# Patient Record
Sex: FEMALE | Race: WHITE | NOT HISPANIC OR LATINO
[De-identification: names, ages, dates, MRNs, and addresses within clinical notes are randomized per-mention and may not be internally consistent; named-entity substitution may affect disease eponyms.]

---

## 2017-10-21 PROBLEM — Z00.00 ENCOUNTER FOR PREVENTIVE HEALTH EXAMINATION: Status: ACTIVE | Noted: 2017-10-21

## 2022-04-28 ENCOUNTER — APPOINTMENT (OUTPATIENT)
Dept: ORTHOPEDIC SURGERY | Facility: CLINIC | Age: 67
End: 2022-04-28
Payer: COMMERCIAL

## 2022-04-28 VITALS — BODY MASS INDEX: 23.08 KG/M2 | WEIGHT: 107 LBS | HEIGHT: 57 IN

## 2022-04-28 PROCEDURE — 99212 OFFICE O/P EST SF 10 MIN: CPT

## 2022-05-02 ENCOUNTER — RESULT CHARGE (OUTPATIENT)
Age: 67
End: 2022-05-02

## 2022-05-02 ENCOUNTER — APPOINTMENT (OUTPATIENT)
Dept: ORTHOPEDIC SURGERY | Facility: CLINIC | Age: 67
End: 2022-05-02
Payer: COMMERCIAL

## 2022-05-02 VITALS — HEIGHT: 57 IN | BODY MASS INDEX: 23.08 KG/M2 | WEIGHT: 107 LBS

## 2022-05-02 PROCEDURE — 73140 X-RAY EXAM OF FINGER(S): CPT | Mod: RT

## 2022-05-02 PROCEDURE — 99203 OFFICE O/P NEW LOW 30 MIN: CPT

## 2022-05-02 NOTE — REASON FOR VISIT
[FreeTextEntry2] : 4/28/22: Follow up b/l knees. Interest in restarting PT as it decreased pain and provided increased function in walking and stairs.

## 2022-05-02 NOTE — HISTORY OF PRESENT ILLNESS
[8] : 8 [3] : 3 [de-identified] : 67 y/o RHD F with R index finger intermittent pain over 10 years after laceration with glass. Pt works for election and it hurts to type. pt states dermatologist opened finger and didn't see a foreign body. [FreeTextEntry5] : pt states she picked up a piece of glass, states she went to derm they opened it with a needle 10 years ago

## 2022-05-02 NOTE — PHYSICAL EXAM
[Orientated] : orientated [Able to Communicate] : able to communicate [Normal Skin] : normal skin [Bilateral] : knee bilaterally [5___] : hamstring 5[unfilled]/5 [] : no erythema [TWNoteComboBox7] : flexion 130 degrees [de-identified] : extension 0 degrees

## 2022-05-02 NOTE — ASSESSMENT
[FreeTextEntry1] : \par Impression: Right knee\par 1. Mild patellofemoral effusion, synovitis and small popliteal cyst with extensor mechanism tendinopathy without tear.\par 2. No acute fracture, ligament tear, meniscal tear or loose body.\par E-signed by Deni Prince MD 09/13/2021\par \par 7/10/2021 Xrays of the right knee reveals 50% medial compartment narrowing and mild PF OA \par Xrays of the left knee reveals 50% medial compartment narrowing and mild PF OA \par Will monitor lump at the right lower leg, but suspect bruising from contusion reported in the garage. Ice therapy as needed. \par Encouraged PT to improve mechanics of the knees/legs. \par Patient had blood workup with outside facility, no immediate diagnosis findings. Reports today various joint pain. Still may have to consider further testing for diagnosis related to multiple joint pain if no improvement with PT.\par \par Attributes various joint pain to reclast inj for osteoporosis, reports she told her prescribing physician and stated not possible. \par Still recommended she start PT and f/u afterwards as needed.\par \par Confirms benefiting from sessions of physical therapy. Recommended resuming.

## 2022-05-02 NOTE — DISCUSSION/SUMMARY
[de-identified] : The patient was seen by Prince Rojas MD\par The documentation recorded by the scribe accurately reflects the service I personally performed and the decisions made by me.\par I, Abraham Stevens, attest that this documentation has been prepared under the direction and in the presence of Provider Prince Rojas MD.\par \par

## 2022-05-02 NOTE — IMAGING
[There are no fractures, subluxations or dislocations. No significant abnormalities are seen] : There are no fractures, subluxations or dislocations. No significant abnormalities are seen [Right] : right fingers [de-identified] : PE R index finger: small palpable mass, mobile, tender, FROM of finger, NVI

## 2022-05-02 NOTE — HISTORY OF PRESENT ILLNESS
[de-identified] : 11/4/21: Reports benefiting from physical therapy. PT reports right leg with shooting pain. \par \par 9/30/21: F/u on affected body sites.\par \par 09/29/2021: 66YO female patient present for B/L knees and left lower leg. PT reports right knee > left knee pain, and patient reports banging her left leg in the garage recently. PT in for second opinion initially saw Dr. Cantor. Had rhematologic consult with no immediate findings after Blood work up. PT reports diagnosis of osteoporosis. PT reports various joint pain after Reclast \par  \par  \par

## 2022-05-05 ENCOUNTER — RESULT REVIEW (OUTPATIENT)
Age: 67
End: 2022-05-05

## 2022-05-05 ENCOUNTER — APPOINTMENT (OUTPATIENT)
Dept: ORTHOPEDIC SURGERY | Facility: CLINIC | Age: 67
End: 2022-05-05
Payer: COMMERCIAL

## 2022-05-05 VITALS — WEIGHT: 107 LBS | HEIGHT: 57 IN | BODY MASS INDEX: 23.08 KG/M2

## 2022-05-05 PROCEDURE — 99243 OFF/OP CNSLTJ NEW/EST LOW 30: CPT

## 2022-05-07 NOTE — HISTORY OF PRESENT ILLNESS
[Gradual] : gradual [8] : 8 [3] : 3 [Dull/Aching] : dull/aching [Intermittent] : intermittent [Work] : work [de-identified] : She reports that she has foreign body at the tip of the finger for many years\par Now coming to surface and painful [] : no [FreeTextEntry1] : right index finger  [FreeTextEntry3] : 10 years ago  [FreeTextEntry5] : patient states she picked up a piece of glass, states she feel there is glass inside, she went to a dermatologist  [de-identified] : activity  [de-identified] : 10 years ago/ 05/02/22 [de-identified] : Dermatologist/ Urgent care  [de-identified] : XR

## 2022-05-07 NOTE — ASSESSMENT
[FreeTextEntry1] : I rec US to evalaute FB\par I disucssed surgery with her for exploration\par Return after US

## 2022-05-12 ENCOUNTER — APPOINTMENT (OUTPATIENT)
Dept: ORTHOPEDIC SURGERY | Facility: CLINIC | Age: 67
End: 2022-05-12
Payer: COMMERCIAL

## 2022-05-12 VITALS — WEIGHT: 107 LBS | HEIGHT: 57 IN | BODY MASS INDEX: 23.08 KG/M2

## 2022-05-12 PROCEDURE — 99213 OFFICE O/P EST LOW 20 MIN: CPT

## 2022-05-17 NOTE — ASSESSMENT
[FreeTextEntry1] : I rec observation at this time\par If more painful, larger then surgery\par Return prn

## 2022-05-17 NOTE — HISTORY OF PRESENT ILLNESS
[4] : 4 [3] : 3 [de-identified] : R IF US neg for FB, mass, cyst [FreeTextEntry1] : r hand [de-identified] : PT

## 2022-06-07 ENCOUNTER — APPOINTMENT (OUTPATIENT)
Dept: ORTHOPEDIC SURGERY | Facility: CLINIC | Age: 67
End: 2022-06-07
Payer: COMMERCIAL

## 2022-06-07 VITALS — BODY MASS INDEX: 23.08 KG/M2 | WEIGHT: 107 LBS | HEIGHT: 57 IN

## 2022-06-07 DIAGNOSIS — S60.459A SUPERFICIAL FOREIGN BODY OF UNSPECIFIED FINGER, INITIAL ENCOUNTER: ICD-10-CM

## 2022-06-07 DIAGNOSIS — R29.898 OTHER SYMPTOMS AND SIGNS INVOLVING THE MUSCULOSKELETAL SYSTEM: ICD-10-CM

## 2022-06-07 DIAGNOSIS — M17.0 BILATERAL PRIMARY OSTEOARTHRITIS OF KNEE: ICD-10-CM

## 2022-06-07 DIAGNOSIS — M22.2X2 PATELLOFEMORAL DISORDERS, RIGHT KNEE: ICD-10-CM

## 2022-06-07 DIAGNOSIS — M22.2X1 PATELLOFEMORAL DISORDERS, RIGHT KNEE: ICD-10-CM

## 2022-06-07 PROCEDURE — 99213 OFFICE O/P EST LOW 20 MIN: CPT

## 2022-06-07 NOTE — HISTORY OF PRESENT ILLNESS
[9] : 9 [7] : 7 [Dull/Aching] : dull/aching [Sharp] : sharp [] : Post Surgical Visit: no [FreeTextEntry1] : vikas knees

## 2022-06-07 NOTE — PHYSICAL EXAM
[Orientated] : orientated [Able to Communicate] : able to communicate [Normal Skin] : normal skin [Bilateral] : knee bilaterally [] : no erythema [de-identified] : -5/5 quad and hamstring [TWNoteComboBox7] : flexion 130 degrees [de-identified] : extension 0 degrees

## 2022-06-07 NOTE — ASSESSMENT
[FreeTextEntry1] : Impression: Right knee\par 1. Mild patellofemoral effusion, synovitis and small popliteal cyst with extensor mechanism tendinopathy without tear.\par 2. No acute fracture, ligament tear, meniscal tear or loose body.\par E-signed by Deni Prince MD 09/13/2021\par \par 7/10/2021 Xrays of the right knee reveals 50% medial compartment narrowing and mild PF OA \par Xrays of the left knee reveals 50% medial compartment narrowing and mild PF OA \par Will monitor lump at the right lower leg, but suspect bruising from contusion reported in the garage. Ice therapy as needed. \par Encouraged PT to improve mechanics of the knees/legs. \par Patient had blood workup with outside facility, no immediate diagnosis findings. Reports today various joint pain. Still may have to consider further testing for diagnosis related to multiple joint pain if no improvement with PT.\par \par Attributes various joint pain to reclast inj for osteoporosis, reports she told her prescribing physician and stated not possible. \par Still recommended she start PT and f/u afterwards as needed.\par \par Confirms benefiting from sessions of physical therapy. Recommended resuming. \par \par 6/722: continue PT and HEP to improve mechanics and reduce pain, and strength knee. \par

## 2022-06-07 NOTE — DISCUSSION/SUMMARY
[de-identified] : The documentation recorded by the scribe accurately reflects the service I personally performed and the decisions made by me.\par I, Abraham Stevens, attest that this documentation has been prepared under the direction and in the presence of Provider Prince Rojas MD.\par \par The patient was seen by Prince Rojas MD\par

## 2022-06-07 NOTE — REASON FOR VISIT
[FreeTextEntry2] : 6/7/22: pt is following up on pain in vikas knees. Going to PT doing good. She feels weakness. SHe has eye surgery aug 7th. She was getting reclast injections for osteoporosis.

## 2022-06-16 ENCOUNTER — APPOINTMENT (OUTPATIENT)
Dept: ORTHOPEDIC SURGERY | Facility: CLINIC | Age: 67
End: 2022-06-16

## 2022-06-23 ENCOUNTER — APPOINTMENT (OUTPATIENT)
Dept: ORTHOPEDIC SURGERY | Facility: CLINIC | Age: 67
End: 2022-06-23

## 2022-10-27 ENCOUNTER — APPOINTMENT (OUTPATIENT)
Dept: ORTHOPEDIC SURGERY | Facility: CLINIC | Age: 67
End: 2022-10-27

## 2022-10-27 DIAGNOSIS — Z78.9 OTHER SPECIFIED HEALTH STATUS: ICD-10-CM

## 2022-10-27 DIAGNOSIS — M79.18 MYALGIA, OTHER SITE: ICD-10-CM

## 2022-10-27 PROCEDURE — 99204 OFFICE O/P NEW MOD 45 MIN: CPT

## 2022-10-27 PROCEDURE — 73564 X-RAY EXAM KNEE 4 OR MORE: CPT | Mod: LT

## 2022-10-27 RX ORDER — METHYLPREDNISOLONE 4 MG/1
4 TABLET ORAL
Qty: 1 | Refills: 0 | Status: ACTIVE | COMMUNITY
Start: 2022-10-27 | End: 1900-01-01

## 2022-10-27 NOTE — HISTORY OF PRESENT ILLNESS
[de-identified] : 66 year old female  ( [employer] worker )  right knee pain since 10/2022 has seen Dr Rojas\par The pain is located  anterior, medial \par The pain is associated with  weakness, swelling, clicking \par Worse with activity and better at rest.\par Has tried ice, nsaids \par

## 2022-10-27 NOTE — ASSESSMENT
[FreeTextEntry1] : Left X-Ray Examination of the KNEE (4 views): medial and patellofemoral degenerate changes.\par \par - We discussed their diagnosis and treatment options at length including the risks and benefits of both surgical and non-surgical options.\par - We will continue conservative treatment with activity modification, PT, icing, weight loss, and anti-inflammatory medications.\par - The patient was provided with a PT prescription to work on ROM, hip ER/abductors strengthening, quad/hamstring stretches and strengthening, and other exercises \par - The patient was advised to let pain guide the gradual advancement of activities. \par - MDP\par - Discussed the possible side effects of medication along with the timing and frequency for taking.\par - Follow up as needed in 6 weeks to re-evaluate, if no improvement we spoke about possibility of viscosupplementation injections

## 2022-10-27 NOTE — IMAGING
[de-identified] : \par LEFT KNEE\par Inspection:  mild effusion\par Palpation: medial joint line tenderness, anterior tenderness\par Knee Range of Motion:  3-125 \par Strength: 5/5 Quadriceps strength, 5/5 Hamstring strength\par Neurological: light touch is intact throughout\par Ligament Stability and Special Tests: \par McMurrays: neg\par Lachman: neg\par Pivot Shift: neg\par Posterior Drawer: neg\par Valgus: neg\par Varus: neg\par Patella Apprehension: neg\par Patella Maltracking: neg\par

## 2023-01-21 ENCOUNTER — APPOINTMENT (OUTPATIENT)
Dept: ORTHOPEDIC SURGERY | Facility: CLINIC | Age: 68
End: 2023-01-21
Payer: COMMERCIAL

## 2023-01-21 VITALS — WEIGHT: 107 LBS | BODY MASS INDEX: 23.08 KG/M2 | HEIGHT: 57 IN

## 2023-01-21 DIAGNOSIS — M81.0 AGE-RELATED OSTEOPOROSIS W/OUT CURRENT PATHOLOGICAL FRACTURE: ICD-10-CM

## 2023-01-21 DIAGNOSIS — S33.5XXA SPRAIN OF LIGAMENTS OF LUMBAR SPINE, INITIAL ENCOUNTER: ICD-10-CM

## 2023-01-21 PROCEDURE — 72170 X-RAY EXAM OF PELVIS: CPT

## 2023-01-21 PROCEDURE — 99213 OFFICE O/P EST LOW 20 MIN: CPT

## 2023-01-21 PROCEDURE — 72100 X-RAY EXAM L-S SPINE 2/3 VWS: CPT

## 2023-01-21 RX ORDER — METHYLPREDNISOLONE 4 MG/1
4 TABLET ORAL
Qty: 1 | Refills: 0 | Status: ACTIVE | COMMUNITY
Start: 2023-01-21 | End: 1900-01-01

## 2023-01-21 NOTE — HISTORY OF PRESENT ILLNESS
[Lower back] : lower back [8] : 8 [Dull/Aching] : dull/aching [Localized] : localized [Sharp] : sharp [Constant] : constant [Bending forward] : bending forward [Full time] : Work status: full time [] : Post Surgical Visit: no [FreeTextEntry3] : 1/18/23 [FreeTextEntry5] : Patient complains of lower back pain after lifting heavy things since 1/18/23\par

## 2023-01-21 NOTE — REASON FOR VISIT
[FreeTextEntry2] : This is a 67 year old F with lower back pain radiating down both legs since lifting heavy on 1/18/23.  No history or treatment.  Had w/u from cardio as she felt her legs were swollen.  everything OK.  hx of osteoporosis.

## 2023-01-21 NOTE — ASSESSMENT
[FreeTextEntry1] : We reviewed the findings and her history.\par MDP and PT planned.\par Should her sx persist, we will obtain an MRI

## 2023-01-21 NOTE — IMAGING
[AP] : anteroposterior [Spondylolithesis] : Spondylolithesis [de-identified] : pevlic calcification - most likely fibroid

## 2023-01-26 ENCOUNTER — APPOINTMENT (OUTPATIENT)
Dept: ORTHOPEDIC SURGERY | Facility: CLINIC | Age: 68
End: 2023-01-26
Payer: COMMERCIAL

## 2023-01-26 VITALS — HEIGHT: 57 IN | WEIGHT: 107 LBS | BODY MASS INDEX: 23.08 KG/M2

## 2023-01-26 DIAGNOSIS — Z78.9 OTHER SPECIFIED HEALTH STATUS: ICD-10-CM

## 2023-01-26 PROCEDURE — 72100 X-RAY EXAM L-S SPINE 2/3 VWS: CPT

## 2023-01-26 PROCEDURE — 72170 X-RAY EXAM OF PELVIS: CPT

## 2023-01-26 PROCEDURE — 20552 NJX 1/MLT TRIGGER POINT 1/2: CPT

## 2023-01-26 PROCEDURE — 99214 OFFICE O/P EST MOD 30 MIN: CPT | Mod: 25

## 2023-01-26 PROCEDURE — J3490M: CUSTOM

## 2023-01-26 NOTE — DISCUSSION/SUMMARY
[de-identified] : REVIEWED The case and the imaging with him \par hard to say if its her back or her hips - has pain around the posterior hip with very limited ROM \par TPI tolerated well \par \par \par discussed that i do recommend she treat the osteoporosis - however its not something that I treat so she need\par \par shes going to go to PT

## 2023-01-26 NOTE — HISTORY OF PRESENT ILLNESS
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

## 2023-05-10 ENCOUNTER — APPOINTMENT (OUTPATIENT)
Dept: ORTHOPEDIC SURGERY | Facility: CLINIC | Age: 68
End: 2023-05-10
Payer: COMMERCIAL

## 2023-05-10 VITALS — BODY MASS INDEX: 23.08 KG/M2 | WEIGHT: 107 LBS | HEIGHT: 57 IN

## 2023-05-10 DIAGNOSIS — M70.50 OTHER BURSITIS OF KNEE, UNSPECIFIED KNEE: ICD-10-CM

## 2023-05-10 PROCEDURE — 99213 OFFICE O/P EST LOW 20 MIN: CPT

## 2023-05-10 NOTE — HISTORY OF PRESENT ILLNESS
[8] : 8 [0] : 0 [Dull/Aching] : dull/aching [Localized] : localized [Standing] : standing [Walking] : walking [Stairs] : stairs [Full time] : Work status: full time [de-identified] : 66 y/o F with atraumatic L knee pain x 1 week. denies buckling or locking. denies numbness/tingling. She has tried tylenol without relief. no pain with stairs. She recently stopped doing yoga. Pt unable to take NSAIDs or steriods \par denies DM.\par  [] : Post Surgical Visit: no [FreeTextEntry1] : left leg [FreeTextEntry5] : Patient is here with pain in her Left leg since 1 week ago, no injury\par states she had done a Doppler done came back negative\par

## 2023-05-10 NOTE — IMAGING
[de-identified] : PE L knee: +swelling to medial proximal tibia, +swelling, no JLT, neg colin, ligs stable, able to SLR, FROM, NVI\par

## 2023-05-10 NOTE — ASSESSMENT
[FreeTextEntry1] : A/P L knee pes anserine bursitis\par - WBAT\par - tylenol\par - stretching\par - f/u sports medicine\par

## 2023-05-18 ENCOUNTER — APPOINTMENT (OUTPATIENT)
Dept: ORTHOPEDIC SURGERY | Facility: CLINIC | Age: 68
End: 2023-05-18
Payer: COMMERCIAL

## 2023-05-18 VITALS — HEIGHT: 59 IN | WEIGHT: 107 LBS | BODY MASS INDEX: 21.57 KG/M2

## 2023-05-18 DIAGNOSIS — M70.52 OTHER BURSITIS OF KNEE, LEFT KNEE: ICD-10-CM

## 2023-05-18 PROCEDURE — 99214 OFFICE O/P EST MOD 30 MIN: CPT

## 2023-05-18 RX ORDER — DICLOFENAC SODIUM 1% 10 MG/G
1 GEL TOPICAL 3 TIMES DAILY
Qty: 1 | Refills: 0 | Status: ACTIVE | COMMUNITY
Start: 2023-05-18 | End: 1900-01-01

## 2023-05-18 NOTE — ASSESSMENT
[FreeTextEntry1] : Left X-Ray Examination of the KNEE (4 views): medial and patellofemoral degenerate changes.\par \par - We discussed their diagnosis and treatment options at length including the risks and benefits of both surgical and non-surgical options.\par - We will continue conservative treatment with activity modification, PT, icing, weight loss, and anti-inflammatory medications.\par - The patient was provided with a PT prescription to work on ROM, hip ER/abductors strengthening, quad/hamstring stretches and strengthening, and other exercises \par - The patient was advised to let pain guide the gradual advancement of activities. \par - voltaren gel rx\par - Patient was given a prescription for an anti-inflammatory medication.  They will take it for the next week and then on an as needed basis, as long as there are no medical contra-indications.  Patient is counseled on possible GI, renal, and cardiovascular side effects.\par - Follow up as needed in 6 weeks to re-evaluate, if no improvement we spoke about possibility of viscosupplementation injections

## 2023-05-18 NOTE — HISTORY OF PRESENT ILLNESS
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

## 2023-05-18 NOTE — IMAGING
[de-identified] : \par LEFT KNEE\par Inspection:  mild effusion\par Palpation: medial joint line tenderness, anterior tenderness and pes ttp\par Knee Range of Motion:  3-125 \par Strength: 5/5 Quadriceps strength, 5/5 Hamstring strength\par Neurological: light touch is intact throughout\par Ligament Stability and Special Tests: \par McMurrays: neg\par Lachman: neg\par Pivot Shift: neg\par Posterior Drawer: neg\par Valgus: neg\par Varus: neg\par Patella Apprehension: neg\par Patella Maltracking: neg\par

## 2023-11-05 ENCOUNTER — APPOINTMENT (OUTPATIENT)
Dept: ORTHOPEDIC SURGERY | Facility: CLINIC | Age: 68
End: 2023-11-05
Payer: COMMERCIAL

## 2023-11-05 VITALS — HEIGHT: 59 IN | WEIGHT: 100 LBS | BODY MASS INDEX: 20.16 KG/M2

## 2023-11-05 PROCEDURE — 99213 OFFICE O/P EST LOW 20 MIN: CPT

## 2023-11-05 PROCEDURE — 72100 X-RAY EXAM L-S SPINE 2/3 VWS: CPT

## 2023-11-05 PROCEDURE — 72170 X-RAY EXAM OF PELVIS: CPT

## 2023-12-04 ENCOUNTER — APPOINTMENT (OUTPATIENT)
Dept: ORTHOPEDIC SURGERY | Facility: CLINIC | Age: 68
End: 2023-12-04
Payer: COMMERCIAL

## 2023-12-04 VITALS — WEIGHT: 100 LBS | HEIGHT: 59 IN | BODY MASS INDEX: 20.16 KG/M2

## 2023-12-04 DIAGNOSIS — M16.11 UNILATERAL PRIMARY OSTEOARTHRITIS, RIGHT HIP: ICD-10-CM

## 2023-12-04 DIAGNOSIS — M16.12 UNILATERAL PRIMARY OSTEOARTHRITIS, LEFT HIP: ICD-10-CM

## 2023-12-04 PROCEDURE — 99213 OFFICE O/P EST LOW 20 MIN: CPT

## 2024-02-26 ENCOUNTER — APPOINTMENT (OUTPATIENT)
Dept: ORTHOPEDIC SURGERY | Facility: CLINIC | Age: 69
End: 2024-02-26
Payer: COMMERCIAL

## 2024-02-26 DIAGNOSIS — M16.0 BILATERAL PRIMARY OSTEOARTHRITIS OF HIP: ICD-10-CM

## 2024-02-26 DIAGNOSIS — M17.12 UNILATERAL PRIMARY OSTEOARTHRITIS, LEFT KNEE: ICD-10-CM

## 2024-02-26 DIAGNOSIS — M43.10 SPONDYLOLISTHESIS, SITE UNSPECIFIED: ICD-10-CM

## 2024-02-26 PROCEDURE — 72110 X-RAY EXAM L-2 SPINE 4/>VWS: CPT

## 2024-02-26 PROCEDURE — 73590 X-RAY EXAM OF LOWER LEG: CPT | Mod: LT

## 2024-02-26 PROCEDURE — 72170 X-RAY EXAM OF PELVIS: CPT

## 2024-02-26 PROCEDURE — 99213 OFFICE O/P EST LOW 20 MIN: CPT | Mod: 25

## 2024-02-26 NOTE — HISTORY OF PRESENT ILLNESS
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 States she had been dealing with an e. coli infection recently. Had a surgery done on her left leg when she was younger. States she consulted with a vascular surgeon in regards to her lower leg pains and was told it was okay  Had had reclast in may last year  xrays today: Lspine - grade 1 ds at L3-4  AP PELVIS - severe hip OA B  LEFT TIB/FIB - no obvious findings  [] : no [FreeTextEntry5] : SHANAE is here today to follow up on her leg pain. pt denies lower back pain. c/o LEFT leg pain.

## 2024-02-26 NOTE — DISCUSSION/SUMMARY
[Medication Risks Reviewed] : Medication risks reviewed [de-identified] : REVIEWED The case and the imaging with him  hard to say if its her back or her hips - has pain around the posterior hip with very limited ROM   indicated for MRi of the l spine - referral to pain managment  discussed that i do recommend she treat the osteoporosis - however its not something that I treat so she need

## 2024-02-26 NOTE — PHYSICAL EXAM
[Bilateral] : hip bilaterally [] : negative straight leg raise [FreeTextEntry3] : very severe limited ROM

## 2024-02-27 ENCOUNTER — APPOINTMENT (OUTPATIENT)
Dept: MRI IMAGING | Facility: CLINIC | Age: 69
End: 2024-02-27
Payer: COMMERCIAL

## 2024-02-27 PROCEDURE — 72148 MRI LUMBAR SPINE W/O DYE: CPT

## 2024-03-02 ENCOUNTER — APPOINTMENT (OUTPATIENT)
Dept: ORTHOPEDIC SURGERY | Facility: CLINIC | Age: 69
End: 2024-03-02
Payer: COMMERCIAL

## 2024-03-02 VITALS — WEIGHT: 100 LBS | HEIGHT: 59 IN | BODY MASS INDEX: 20.16 KG/M2

## 2024-03-02 PROCEDURE — 99214 OFFICE O/P EST MOD 30 MIN: CPT

## 2024-03-02 NOTE — DISCUSSION/SUMMARY
[Medication Risks Reviewed] : Medication risks reviewed [de-identified] : REVIEWED The case and the imaging with her stenosis/spondylolisthesis L2-5  reviewed the case   Discussion of the hip OA and that I dont do those - explained that in detail   indicated for MRi of the l spine - referral to pain managment  discussed that i do recommend she treat the osteoporosis - however its not something that I treat so she need

## 2024-03-02 NOTE — HISTORY OF PRESENT ILLNESS
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 States she had been dealing with an e. coli infection recently. Had a surgery done on her left leg when she was younger. States she consulted with a vascular surgeon in regards to her lower leg pains and was told it was okay  Had had reclast in may last year  xrays today: Lspine - grade 1 ds at L3-4  AP PELVIS - severe hip OA B  LEFT TIB/FIB - no obvious findings   3/2/24: here for fu - plan at last was mri - overall the pain remain in the legs -   MRi L spine - see report  [] : no [FreeTextEntry5] : Pt presents today for a follow u visit to reiew mri results of the L-spine

## 2024-03-05 ENCOUNTER — APPOINTMENT (OUTPATIENT)
Dept: PAIN MANAGEMENT | Facility: CLINIC | Age: 69
End: 2024-03-05
Payer: COMMERCIAL

## 2024-03-05 VITALS — HEIGHT: 59 IN | BODY MASS INDEX: 20.16 KG/M2 | WEIGHT: 100 LBS

## 2024-03-05 PROCEDURE — 99244 OFF/OP CNSLTJ NEW/EST MOD 40: CPT

## 2024-03-18 RX ORDER — TIZANIDINE 4 MG/1
4 TABLET ORAL
Qty: 30 | Refills: 1 | Status: ACTIVE | COMMUNITY
Start: 2024-03-18 | End: 1900-01-01

## 2024-03-26 ENCOUNTER — NON-APPOINTMENT (OUTPATIENT)
Age: 69
End: 2024-03-26

## 2024-03-26 ENCOUNTER — APPOINTMENT (OUTPATIENT)
Dept: PAIN MANAGEMENT | Facility: CLINIC | Age: 69
End: 2024-03-26
Payer: COMMERCIAL

## 2024-03-26 PROCEDURE — 64483 NJX AA&/STRD TFRM EPI L/S 1: CPT | Mod: LT

## 2024-03-26 PROCEDURE — J3490M: CUSTOM

## 2024-03-26 PROCEDURE — 64484 NJX AA&/STRD TFRM EPI L/S EA: CPT | Mod: 59,LT

## 2024-03-26 NOTE — PROCEDURE
[FreeTextEntry3] : Date of Service: 03/26/2024   Account: [id number]  Patient: SHANAE ROBERTSON   YOB: 1955  Age: 68 year   Surgeon:                                                         Elgin Saunders D.O.  Assistant:                                                        None  Pre-Operative Diagnosis:                              Lumbosacral radiculitis (M54.17)  Post-Operative Diagnosis:                            Same  Procedure:                                                      Left L3-4, L4-5, transforaminal epidural steroid injection under fluoroscopic guidance.  Anesthesia:                                                     Local with MAC  This procedure was carried out using fluoroscopic guidance.  The risks and benefits of the procedure were discussed extensively with the patient.  The consent of the patient was obtained and the following procedure was performed. The patient was placed in the prone position on the fluoroscopic table and the lumbar area was prepped and draped in a sterile fashion. A timeout was performed with all essential staff present and the site and side were verified.   The left L3-4 neural foramen was identified on left oblique "ibrahima dog" anatomical view at the 6 o'clock position using fluoroscopic guidance, and the area was marked. The overlying skin and subcutaneous structures were anesthetized using sterile technique with 1% Lidocaine.  A 22-gauge spinal needle was directed toward the inferior (6 o'clock) position of the pedicle, which formed the roof of the identified foramen.  Once in the epidural space, after negative aspiration for heme and CSF, 1cc of Omnipaque contrast was injected under live fluoroscopy to confirm epidural location and assess filling defects and rule out intravascular needle placement. The following epidurogram was observed: no intravascular or intrathecal flow pattern was noted.  No blood or CSF was aspirated. Contrast spread medially in epidural space.  After this, an injectate of 1 cc preservative free normal saline plus 6 mg of betamethasone and 1 cc of 0.25% bupivacaine was injected in the epidural space.  The left L4-5 neural foramen was identified on left oblique "ibrahima dog" anatomical view at the 6 o'clock position using fluoroscopic guidance, and the area was marked. The overlying skin and subcutaneous structures were anesthetized using sterile technique with 1% Lidocaine.  A 22-gauge spinal needle was directed toward the inferior (6 o'clock) position of the pedicle, which formed the roof of the identified foramen.  Once in the epidural space, after negative aspiration for heme and CSF, 1cc of Omnipaque contrast was injected under live fluoroscopy to confirm epidural location and assess filling defects and rule out intravascular needle placement. The following epidurogram was observed: no intravascular or intrathecal flow pattern was noted.  No blood or CSF was aspirated. Contrast spread medially in epidural space.  After this, an injectate of 1 cc preservative free normal saline plus 6 mg of betamethasone and 1 cc of 0.25% bupivacaine was injected in the epidural space.  The needle was subsequently removed.  Vital signs remained normal.  Pulse oximeter was used throughout the procedure and the patient's pulse and oxygen saturation remained within normal limits.  The patient tolerated the procedure well.  There were no complications.  The patient was instructed to apply ice over the injection sites for twenty minutes every two hours for the next 24 to 48 hours.  The patient was also instructed to contact me immediately if there were any problems.  Elgin Saunders D.O.

## 2024-04-09 ENCOUNTER — APPOINTMENT (OUTPATIENT)
Dept: PAIN MANAGEMENT | Facility: CLINIC | Age: 69
End: 2024-04-09
Payer: COMMERCIAL

## 2024-04-09 VITALS — BODY MASS INDEX: 20.16 KG/M2 | HEIGHT: 59 IN | WEIGHT: 100 LBS

## 2024-04-09 PROCEDURE — 99213 OFFICE O/P EST LOW 20 MIN: CPT

## 2024-04-09 NOTE — PHYSICAL EXAM
[de-identified] : Constitutional; Appears well, no apparent distress Ability to communicate: Normal  Respiratory: non-labored breathing Skin: No rash noted Head: Normocephalic, atraumatic Neck: no visible thyroid enlargement Eyes: Extraocular movements intact Neurologic: Alert and oriented x3 Psychiatric: normal mood, affect and behavior [] : non-antalgic

## 2024-04-09 NOTE — HISTORY OF PRESENT ILLNESS
[Lower back] : lower back [8] : 8 [Radiating] : radiating [Constant] : constant [Rest] : rest [Sitting] : sitting [Walking] : walking [FreeTextEntry1] : 04/09/2024: s/p Left L3-4 L4-5 TFESI on 03/26/24 with >60% relief and improvement of ADLs. Still has some pain at the left anterior thigh and lower leg.   Initial HPI 03/05/2024: Pain started a few weeks ago and is on the LEFT buttock, anterior thigh and shin described as a sharp pain. Saw Dr. Fry who recommended LESI.   MRI Lumbar Spine 2/27/24 independently reviewed: L2-3, L3-4, L4-5 moderate/severe stenosis with nerve impingement.  Conservative Care: has been doing PT, Yoga  Pain Medications: Tylenol PRN  Past Injections: TPI with some relief  Spine surgery: none  Blood thinners: none  [] : no [FreeTextEntry7] : b/l sides  [TWNoteComboBox1] : 50%

## 2024-04-09 NOTE — DISCUSSION/SUMMARY
[de-identified] : After discussing various treatment options with the patient including but not limited to oral medications, physical therapy, exercise modalities as well as interventional spinal injections, we have decided with the following plan:  - Continue Home exercises, stretching, activity modification, physical therapy, and conservative care. - MRI report and/or images was reviewed and discussed with the patient. - Recommend Left L3-4, L4-5 Transforaminal Epidural Steroid Injection under fluoroscopic guidance with image. - The risks, benefits and alternatives of the proposed procedure were explained in detail with the patient. The risks outlined include but are not limited to infection, bleeding, post-dural puncture headache, nerve injury, a temporary increase in pain, failure to resolve symptoms, allergic reaction, symptom recurrence, and possible elevation of blood sugar in diabetics. All questions were answered to patient's apparent satisfaction and he/she verbalized an understanding. - Patient is presenting with acute/sub-acute radicular pain with impairment in ADLs and functionality.  The pain has not responded to conservative care including NSAID therapy and/or physical therapy.  There is no bleeding tendency, unstable medical condition, or systemic infection. - Follow up in 1-2 weeks post injection for re-evaluation.

## 2024-05-21 ENCOUNTER — APPOINTMENT (OUTPATIENT)
Dept: PAIN MANAGEMENT | Facility: CLINIC | Age: 69
End: 2024-05-21
Payer: COMMERCIAL

## 2024-05-21 PROCEDURE — J3490M: CUSTOM

## 2024-05-21 PROCEDURE — 64483 NJX AA&/STRD TFRM EPI L/S 1: CPT | Mod: LT

## 2024-05-21 PROCEDURE — 64484 NJX AA&/STRD TFRM EPI L/S EA: CPT | Mod: LT,59

## 2024-05-21 NOTE — PROCEDURE
[FreeTextEntry3] : Date of Service: 05/21/2024   Account: [id number]  Patient: SHANAE ROBERTSON   YOB: 1955  Age: 68 year   Surgeon:                                                         Elgin Saunders D.O.  Assistant:                                                        None  Pre-Operative Diagnosis:                              Lumbosacral radiculitis (M54.17)  Post-Operative Diagnosis:                            Same  Procedure:                                                      Left L3-4, L4-5, transforaminal epidural steroid injection under fluoroscopic guidance.  Anesthesia:                                                     Local with MAC  This procedure was carried out using fluoroscopic guidance.  The risks and benefits of the procedure were discussed extensively with the patient.  The consent of the patient was obtained and the following procedure was performed. The patient was placed in the prone position on the fluoroscopic table and the lumbar area was prepped and draped in a sterile fashion. A timeout was performed with all essential staff present and the site and side were verified.   The left L3-4 neural foramen was identified on left oblique "ibrahima dog" anatomical view at the 6 o'clock position using fluoroscopic guidance, and the area was marked. The overlying skin and subcutaneous structures were anesthetized using sterile technique with 1% Lidocaine.  A 22-gauge spinal needle was directed toward the inferior (6 o'clock) position of the pedicle, which formed the roof of the identified foramen.  Once in the epidural space, after negative aspiration for heme and CSF, 1cc of Omnipaque contrast was injected under live fluoroscopy to confirm epidural location and assess filling defects and rule out intravascular needle placement. The following epidurogram was observed: no intravascular or intrathecal flow pattern was noted.  No blood or CSF was aspirated. Contrast spread medially in epidural space.  After this, an injectate of 1 cc preservative free normal saline plus 6 mg of betamethasone and 1 cc of 0.25% bupivacaine was injected in the epidural space.  The left L4-5 neural foramen was identified on left oblique "ibrahima dog" anatomical view at the 6 o'clock position using fluoroscopic guidance, and the area was marked. The overlying skin and subcutaneous structures were anesthetized using sterile technique with 1% Lidocaine.  A 22-gauge spinal needle was directed toward the inferior (6 o'clock) position of the pedicle, which formed the roof of the identified foramen.  Once in the epidural space, after negative aspiration for heme and CSF, 1cc of Omnipaque contrast was injected under live fluoroscopy to confirm epidural location and assess filling defects and rule out intravascular needle placement. The following epidurogram was observed: no intravascular or intrathecal flow pattern was noted.  No blood or CSF was aspirated. Contrast spread medially in epidural space.  After this, an injectate of 1 cc preservative free normal saline plus 6 mg of betamethasone and 1 cc of 0.25% bupivacaine was injected in the epidural space.  The needle was subsequently removed.  Vital signs remained normal.  Pulse oximeter was used throughout the procedure and the patient's pulse and oxygen saturation remained within normal limits.  The patient tolerated the procedure well.  There were no complications.  The patient was instructed to apply ice over the injection sites for twenty minutes every two hours for the next 24 to 48 hours.  The patient was also instructed to contact me immediately if there were any problems.  Elgin Saunders D.O.

## 2024-05-27 ENCOUNTER — APPOINTMENT (OUTPATIENT)
Dept: ORTHOPEDIC SURGERY | Facility: CLINIC | Age: 69
End: 2024-05-27
Payer: COMMERCIAL

## 2024-05-27 VITALS — WEIGHT: 100 LBS | HEIGHT: 59 IN | BODY MASS INDEX: 20.16 KG/M2

## 2024-05-27 PROCEDURE — 99203 OFFICE O/P NEW LOW 30 MIN: CPT

## 2024-05-27 PROCEDURE — 99213 OFFICE O/P EST LOW 20 MIN: CPT

## 2024-05-27 PROCEDURE — 73552 X-RAY EXAM OF FEMUR 2/>: CPT | Mod: LT

## 2024-05-27 RX ORDER — METHYLPREDNISOLONE 4 MG/1
4 TABLET ORAL
Qty: 1 | Refills: 0 | Status: ACTIVE | COMMUNITY
Start: 2024-05-27 | End: 1900-01-01

## 2024-05-28 RX ORDER — MELOXICAM 15 MG/1
15 TABLET ORAL
Qty: 30 | Refills: 0 | Status: ACTIVE | COMMUNITY
Start: 2024-05-28 | End: 1900-01-01

## 2024-06-01 NOTE — IMAGING
[de-identified] : PE lumbar spine: +tenderness to L paraspinals, full active ROM of hip, +tenderness over midshaft femur, no midline tenderness, limited motion due to pain, able to SLR with pain, motor and sensory intact, NVI [There are no fractures, subluxations or dislocations. No significant abnormalities are seen] : There are no fractures, subluxations or dislocations. No significant abnormalities are seen [de-identified] : no beaking.

## 2024-06-01 NOTE — HISTORY OF PRESENT ILLNESS
[Lower back] : lower back [de-identified] : 69 y/o F with atraumatic chronic low back pain with radiation to LLE. Denies numbness/tingling. denies bladder or bowel issues. She has been seen by Dr. Saunders. denies DM. h/o bisphosphonates use.  [FreeTextEntry1] : left lower back [FreeTextEntry5] : left lower back that had been treated by Dr. pino

## 2024-06-04 ENCOUNTER — APPOINTMENT (OUTPATIENT)
Dept: PAIN MANAGEMENT | Facility: CLINIC | Age: 69
End: 2024-06-04
Payer: COMMERCIAL

## 2024-06-04 VITALS — HEIGHT: 59 IN | BODY MASS INDEX: 20.16 KG/M2 | WEIGHT: 100 LBS

## 2024-06-04 DIAGNOSIS — M54.16 RADICULOPATHY, LUMBAR REGION: ICD-10-CM

## 2024-06-04 DIAGNOSIS — M47.816 SPONDYLOSIS W/OUT MYELOPATHY OR RADICULOPATHY, LUMBAR REGION: ICD-10-CM

## 2024-06-04 DIAGNOSIS — M51.26 OTHER INTERVERTEBRAL DISC DISPLACEMENT, LUMBAR REGION: ICD-10-CM

## 2024-06-04 PROCEDURE — 99214 OFFICE O/P EST MOD 30 MIN: CPT

## 2024-06-04 RX ORDER — GABAPENTIN 300 MG/1
300 CAPSULE ORAL
Qty: 30 | Refills: 2 | Status: ACTIVE | COMMUNITY
Start: 2024-06-04 | End: 1900-01-01

## 2024-06-04 NOTE — DISCUSSION/SUMMARY
[de-identified] : After discussing various treatment options with the patient including but not limited to oral medications, physical therapy, exercise modalities as well as interventional spinal injections, we have decided with the following plan:  - Continue Home exercises, stretching, activity modification, physical therapy, and conservative care. - MRI report and/or images was reviewed and discussed with the patient. - Recommend L3-4 Lumbar Epidural Steroid Injection under fluoroscopic guidance with image. - The risks, benefits and alternatives of the proposed procedure were explained in detail with the patient. The risks outlined include but are not limited to infection, bleeding, post-dural puncture headache, nerve injury, a temporary increase in pain, failure to resolve symptoms, allergic reaction, symptom recurrence, and possible elevation of blood sugar in diabetics. All questions were answered to patient's apparent satisfaction and he/she verbalized an understanding. - Patient is presenting with acute/sub-acute radicular pain with impairment in ADLs and functionality.  The pain has not responded to conservative care including NSAID therapy and/or physical therapy.  There is no bleeding tendency, unstable medical condition, or systemic infection. - Follow up in 1-2 weeks post injection for re-evaluation. - Will call to schedule.  - Recommend Gabapentin 300mg QHS. Pt instructed not to drive or operate heavy machinery while on medications.

## 2024-06-04 NOTE — PHYSICAL EXAM
[de-identified] : Constitutional; Appears well, no apparent distress Ability to communicate: Normal  Respiratory: non-labored breathing Skin: No rash noted Head: Normocephalic, atraumatic Neck: no visible thyroid enlargement Eyes: Extraocular movements intact Neurologic: Alert and oriented x3 Psychiatric: normal mood, affect and behavior [] : non-antalgic

## 2024-06-04 NOTE — HISTORY OF PRESENT ILLNESS
[Lower back] : lower back [8] : 8 [Radiating] : radiating [Constant] : constant [Rest] : rest [Sitting] : sitting [Walking] : walking [FreeTextEntry1] : 06/04/2024: s/p Left L3-4 L4-5 TFESI on 05/21/24 with >50% relief and improvement of ADLs. Now having tingling down the right anterior thigh as well. Was doing PT and no longer approved.   04/09/2024: s/p Left L3-4 L4-5 TFESI on 03/26/24 with >60% relief and improvement of ADLs. Still has some pain at the left anterior thigh and lower leg.   Initial HPI 03/05/2024: Pain started a few weeks ago and is on the LEFT buttock, anterior thigh and shin described as a sharp pain. Saw Dr. Fry who recommended LESI.   MRI Lumbar Spine 2/27/24 independently reviewed: L2-3, L3-4, L4-5 moderate/severe stenosis with nerve impingement.  Conservative Care: has been doing PT, Yoga  Pain Medications: Tylenol PRN  Past Injections: TPI with some relief  Spine surgery: none  Blood thinners: none  [] : no [FreeTextEntry7] : b/l sides